# Patient Record
Sex: FEMALE | Race: WHITE | NOT HISPANIC OR LATINO | URBAN - METROPOLITAN AREA
[De-identification: names, ages, dates, MRNs, and addresses within clinical notes are randomized per-mention and may not be internally consistent; named-entity substitution may affect disease eponyms.]

---

## 2023-11-06 PROBLEM — N95.1 PERIMENOPAUSE: Status: ACTIVE | Noted: 2023-11-06

## 2023-11-06 PROBLEM — N94.6 DYSMENORRHEA: Status: ACTIVE | Noted: 2023-11-06

## 2024-01-08 ENCOUNTER — TELEPHONE (OUTPATIENT)
Dept: OBSTETRICS AND GYNECOLOGY | Facility: CLINIC | Age: 54
End: 2024-01-08
Payer: COMMERCIAL

## 2024-01-08 NOTE — TELEPHONE ENCOUNTER
Est patient last seen 3/16/2023. Has appt 3/27/2024. Has had Mirena IUD since 9/20/2017. Patient questioning if cramping and symptoms can be normal this far out with Mirena. States pain on right side monthly this month lasting for 8 days. No vaginal bleeding. Admits to bowel issues at times and mother passing away a month ago. Unsure if related to menopause. Last pelvic ultrasound was 5/8/2023 and essentially negative exam. Advised patient can be normal. If pain is severe and not relieved with OTC medication more concerning. Patient would like to monitor for now and will call office if symptoms increase or become more concerning. Advised will send a message to Dr. Torres as well to inform her and if she disagrees with monitoring will call.

## 2024-02-01 ENCOUNTER — APPOINTMENT (OUTPATIENT)
Dept: PRIMARY CARE | Facility: CLINIC | Age: 54
End: 2024-02-01
Payer: COMMERCIAL

## 2024-02-01 PROBLEM — N92.0 EXCESSIVE AND FREQUENT MENSTRUATION: Status: ACTIVE | Noted: 2024-02-01

## 2024-02-01 RX ORDER — ALPRAZOLAM 0.5 MG/1
TABLET ORAL
COMMUNITY
Start: 2024-01-31

## 2024-02-01 RX ORDER — BUPROPION HYDROCHLORIDE 150 MG/1
TABLET ORAL
COMMUNITY
Start: 2024-01-31

## 2024-02-01 RX ORDER — SIMVASTATIN 20 MG/1
TABLET, FILM COATED ORAL
COMMUNITY
Start: 2024-01-31

## 2024-03-05 ENCOUNTER — OFFICE VISIT (OUTPATIENT)
Dept: PRIMARY CARE | Facility: EXTERNAL LOCATION | Age: 54
End: 2024-03-05
Payer: COMMERCIAL

## 2024-03-05 VITALS
DIASTOLIC BLOOD PRESSURE: 98 MMHG | WEIGHT: 180 LBS | BODY MASS INDEX: 35.75 KG/M2 | OXYGEN SATURATION: 98 % | TEMPERATURE: 97.9 F | HEART RATE: 90 BPM | SYSTOLIC BLOOD PRESSURE: 144 MMHG

## 2024-03-05 DIAGNOSIS — J01.90 ACUTE NON-RECURRENT SINUSITIS, UNSPECIFIED LOCATION: Primary | ICD-10-CM

## 2024-03-05 DIAGNOSIS — J02.9 ACUTE PHARYNGITIS, UNSPECIFIED ETIOLOGY: ICD-10-CM

## 2024-03-05 DIAGNOSIS — R03.0 ELEVATED BLOOD PRESSURE READING: ICD-10-CM

## 2024-03-05 RX ORDER — FLUTICASONE PROPIONATE 50 MCG
2 SPRAY, SUSPENSION (ML) NASAL
COMMUNITY
Start: 2022-05-27

## 2024-03-05 RX ORDER — AMOXICILLIN 875 MG/1
875 TABLET, FILM COATED ORAL 2 TIMES DAILY
Qty: 20 TABLET | Refills: 0 | Status: SHIPPED | OUTPATIENT
Start: 2024-03-05 | End: 2024-03-15

## 2024-03-05 ASSESSMENT — ENCOUNTER SYMPTOMS
SORE THROAT: 1
COUGH: 0
FEVER: 0
PALPITATIONS: 0
SINUS PRESSURE: 1
CHILLS: 0
BACK PAIN: 0
SHORTNESS OF BREATH: 0

## 2024-03-05 NOTE — PROGRESS NOTES
Subjective   Patient ID: Brittany Alonso is a 53 y.o. female who presents for Sore Throat (Sore throat x 2 weeks).    HPI:  Sore throat for 2 weeks.   Started with congestion and cold symptoms. Symptoms went away and then came back again.     Continues with sore throat and mild sinus pressure. Sore throat on left side.   No fever or chills.   Not coughing.  Occasionally hoarse voice.      Occasional heartburn. Non currently. Had tacos the other night and had heartburn- resolved with antacid.     Allergies   Allergen Reactions    Clarithromycin GI Upset     Stomach cramps       Current Outpatient Medications on File Prior to Visit   Medication Sig    ALPRAZolam (Xanax) 0.5 mg tablet     buPROPion XL (Wellbutrin XL) 150 mg 24 hr tablet     fluticasone (Flonase) 50 mcg/actuation nasal spray 2 sprays by Does not apply route.    levonorgestrel (Mirena) 21 mcg/24 hours (8 yrs) 52 mg IUD as directed Intrauterine    sertraline (Zoloft) 100 mg tablet once every 24 hours.    simvastatin (Zocor) 20 mg tablet      No current facility-administered medications on file prior to visit.        Past Medical History:   Diagnosis Date    Anxiety     Hyperlipidemia     Perimenopause        Past Surgical History:   Procedure Laterality Date     SECTION, CLASSIC         Review of Systems   Constitutional:  Negative for chills and fever.   HENT:  Positive for sinus pressure (mild) and sore throat. Negative for congestion (resolved).    Respiratory:  Negative for cough and shortness of breath.    Cardiovascular:  Negative for chest pain and palpitations.   Musculoskeletal:  Negative for back pain.       Objective   Visit Vitals  BP (!) 144/98   Pulse 90   Temp 36.6 °C (97.9 °F)   Wt 81.6 kg (180 lb)   SpO2 98%   BMI 35.75 kg/m²   OB Status Perimenopausal   Smoking Status Never   BSA 1.85 m²         Physical Exam  Constitutional:       General: She is not in acute distress.     Appearance: Normal appearance.   HENT:      Right Ear:  Tympanic membrane, ear canal and external ear normal.      Left Ear: Tympanic membrane, ear canal and external ear normal.      Nose: Nose normal.      Mouth/Throat:      Mouth: Mucous membranes are moist.      Pharynx: Oropharynx is clear. Posterior oropharyngeal erythema (very mild) present. No oropharyngeal exudate.      Comments: Some PND seen  Eyes:      Extraocular Movements: Extraocular movements intact.      Conjunctiva/sclera: Conjunctivae normal.   Cardiovascular:      Rate and Rhythm: Normal rate and regular rhythm.   Pulmonary:      Effort: Pulmonary effort is normal.      Breath sounds: Normal breath sounds.   Musculoskeletal:      Cervical back: Neck supple.   Lymphadenopathy:      Cervical: Cervical adenopathy present.   Skin:     General: Skin is warm.   Neurological:      General: No focal deficit present.      Mental Status: She is alert.      Gait: Gait normal.   Psychiatric:         Mood and Affect: Mood normal.         Behavior: Behavior normal.         Thought Content: Thought content normal.         Assessment/Plan   Diagnoses and all orders for this visit:  Acute non-recurrent sinusitis, unspecified location  -     amoxicillin (Amoxil) 875 mg tablet; Take 1 tablet (875 mg) by mouth 2 times a day for 10 days.  Acute pharyngitis, unspecified etiology  Elevated blood pressure reading    -Discussed multiple possible etiologies including viral versus bacterial etiology and expected course of illness. Also discussed GERD.   - Will treat for sinusitis. Patient agreeable. Possible side effects of medication discussed.   - Recommend OTC Prilosec if having heartburn. Denies current heartburn.   - Can use OTC pain relief as needed.   - BP elevated. Instructed to monitor and recheck with PCP if remains elevated.   - Follow up with PCP for recheck in 3 days. Please be seen sooner with concerns or worsening symptoms.

## 2024-03-05 NOTE — PATIENT INSTRUCTIONS
SINUSITIS TREATMENT  - Your antibiotic was sent to the pharmacy  - Possible medication side effects and interactions were discussed.  - Recommend probiotic.   - Recommend Mucinex, Flonase and increased liquid intake.   - Follow up with your primary care provider  in 3 days if no improvement.   - Return to clinic sooner with concerns or  any worsening symptoms.     STEPS YOU CAN TAKE AT HOME  - Drink lots of liquids to stay hydrated.  - Use cool mist humidifier in sleeping areas to avoid dry air.   - Use saline nose drops or a saline nose rinse to relieve stuffiness.  - Wash your hands often. This will help keep others healthy.  - Do not smoke or be in smoke-filled places. Avoid things that may cause breathing problems like fumes, pollution, dust, and other common allergens and irritants.  - Avoid water diving. This forces water into the sinuses from the nasal passages.  - You may take over the counter medication to help with pain as needed.      CALL YOUR PROVIDER OR GO TO URGENT CARE/ED IF :  You have a stiff neck, especially if you also have fever, chills, vomiting, or severe headache  You have trouble thinking clearly.  You have trouble seeing or have double vision.  You have swelling or redness or pain around one or both eyes.  You have a fever of 102°F (38.9°C) or higher, or have shaking chills or sweats.  You have an upset stomach and throwing up.  You have more pain in your face and head.   You develop chest pain or shortness of breath.   With any worsening symptoms/concerns.

## 2024-03-21 ENCOUNTER — TELEPHONE (OUTPATIENT)
Dept: OBSTETRICS AND GYNECOLOGY | Facility: CLINIC | Age: 54
End: 2024-03-21
Payer: COMMERCIAL

## 2024-03-21 DIAGNOSIS — Z12.31 ENCOUNTER FOR SCREENING MAMMOGRAM FOR MALIGNANT NEOPLASM OF BREAST: ICD-10-CM

## 2024-03-22 DIAGNOSIS — Z12.31 ENCOUNTER FOR SCREENING MAMMOGRAM FOR MALIGNANT NEOPLASM OF BREAST: Primary | ICD-10-CM

## 2024-03-27 ENCOUNTER — OFFICE VISIT (OUTPATIENT)
Dept: OBSTETRICS AND GYNECOLOGY | Facility: CLINIC | Age: 54
End: 2024-03-27
Payer: COMMERCIAL

## 2024-03-27 ENCOUNTER — LAB (OUTPATIENT)
Dept: LAB | Facility: LAB | Age: 54
End: 2024-03-27
Payer: COMMERCIAL

## 2024-03-27 VITALS
HEIGHT: 59 IN | SYSTOLIC BLOOD PRESSURE: 138 MMHG | BODY MASS INDEX: 36.61 KG/M2 | DIASTOLIC BLOOD PRESSURE: 80 MMHG | WEIGHT: 181.6 LBS

## 2024-03-27 DIAGNOSIS — N95.1 PERIMENOPAUSE: ICD-10-CM

## 2024-03-27 DIAGNOSIS — N92.6 IRREGULAR MENSES: ICD-10-CM

## 2024-03-27 DIAGNOSIS — Z12.31 ENCOUNTER FOR SCREENING MAMMOGRAM FOR MALIGNANT NEOPLASM OF BREAST: ICD-10-CM

## 2024-03-27 DIAGNOSIS — Z30.432 ENCOUNTER FOR IUD REMOVAL: ICD-10-CM

## 2024-03-27 DIAGNOSIS — R10.2 PELVIC PAIN: ICD-10-CM

## 2024-03-27 DIAGNOSIS — Z01.419 VISIT FOR GYNECOLOGIC EXAMINATION: Primary | ICD-10-CM

## 2024-03-27 PROCEDURE — 99396 PREV VISIT EST AGE 40-64: CPT | Performed by: OBSTETRICS & GYNECOLOGY

## 2024-03-27 PROCEDURE — 58301 REMOVE INTRAUTERINE DEVICE: CPT | Performed by: OBSTETRICS & GYNECOLOGY

## 2024-03-27 PROCEDURE — 36415 COLL VENOUS BLD VENIPUNCTURE: CPT

## 2024-03-27 PROCEDURE — 83001 ASSAY OF GONADOTROPIN (FSH): CPT

## 2024-03-27 ASSESSMENT — ENCOUNTER SYMPTOMS
OCCASIONAL FEELINGS OF UNSTEADINESS: 0
DEPRESSION: 0
LOSS OF SENSATION IN FEET: 0

## 2024-03-27 ASSESSMENT — PATIENT HEALTH QUESTIONNAIRE - PHQ9
SUM OF ALL RESPONSES TO PHQ9 QUESTIONS 1 & 2: 0
2. FEELING DOWN, DEPRESSED OR HOPELESS: NOT AT ALL
1. LITTLE INTEREST OR PLEASURE IN DOING THINGS: NOT AT ALL

## 2024-03-27 ASSESSMENT — LIFESTYLE VARIABLES
HOW OFTEN DO YOU HAVE A DRINK CONTAINING ALCOHOL: 2-3 TIMES A WEEK
AUDIT-C TOTAL SCORE: 3
HOW OFTEN DO YOU HAVE SIX OR MORE DRINKS ON ONE OCCASION: NEVER
SKIP TO QUESTIONS 9-10: 1
HOW MANY STANDARD DRINKS CONTAINING ALCOHOL DO YOU HAVE ON A TYPICAL DAY: 1 OR 2

## 2024-03-27 ASSESSMENT — PAIN SCALES - GENERAL: PAINLEVEL: 0-NO PAIN

## 2024-03-27 NOTE — PROGRESS NOTES
"Annual-menopause  Subjective   Brittany Alonso is a 53 y.o. female who is here for a routine exam.   Complaints:  pos for irreg/protracted spotting and intermittent sharp pains either side of pelvis; nothing intense enough to req meds or stop her activities; questions menopause.   denies pelvic  pressure, or persistent bloating.  PMHx: PCP =NP @[facility].       BMI 37.  High cholesterol. Anxiety. Depression.  Surgical History        laparoscopy to [facility]; Dr. Berger; removed cysts        C section ;  tubal ligation ; D&C, x 2  Father:  58 yrs, cancer, lung  Mother: alive, diabetes, htn, s/p bso for cysts  Last pap  2023-BOTH pap/hpv NEG  Mamm 2023-NEG,    Birth control Mirena IUD;placed 2017.   Menarche 8. STDs none. currently sexually active; denies exposure concerns.   Total preg  4.   Pregnancy # 1:  spontaneous  W/ D&C.   Pregnancy # 2:  spontaneous  W/ D&C.   Pregnancy # 3:   FEMALE \"HUGHES\", Primary C/S.   Pregnancy # 4:   MALE \"STEPHANIE\", Repeat C/S.    Review of Systems   Constitutional:  Negative for activity change, fatigue and unexpected weight change.   Respiratory:  Negative for chest tightness and shortness of breath.    Cardiovascular:  Negative for chest pain and leg swelling.   Gastrointestinal:  Negative for abdominal distention and abdominal pain.   Genitourinary:  Positive for pelvic pain and vaginal bleeding. Negative for difficulty urinating, dysuria, genital sores, vaginal discharge and vaginal pain.   Musculoskeletal:  Negative for gait problem and joint swelling.   Skin:  Negative for color change and rash.   Neurological:  Negative for dizziness, weakness and headaches.   Hematological:  Negative for adenopathy.   Objective   /80   Ht 1.499 m (4' 11\")   Wt 82.4 kg (181 lb 9.6 oz)   BMI 36.68 kg/m²    General:   Alert and oriented, in no acute distress   Neck: Supple. No visible thyromegaly.    Breast/Axilla: Normal to " palpation bilaterally without masses, skin changes, or nipple discharge.    Abdomen: Soft, non-tender, without masses or organomegaly   Vulva: Normal architecture without erythema, masses, or lesions.    Vagina: Normal mucosa without lesions, masses.   No abnormal vaginal discharge. Upper vault with mall amt brown blood   Cervix: Normal without masses, lesions, or signs of cervicitis. NO strings visible; reports tenderness with palaption.   Uterus:  Grossly normal mobile, non-enlarged uterus; exam limited by bmi and scarring abd wall from c/s x 2    Adnexa: No palpable masses ortenderness   Pelvic Floor No POP noted. No high tone pelvic floor    Psych  Rectal Normal affect. Normal mood.      Assessment/Plan   Encounter Diagnoses   Name Primary?    Visit for gynecologic examination; grossly benign breast/gyn exam. Yes    Perimenopause; irreg menses on iud; wanting fsh chk     Encounter for screening mammogram for malignant neoplasm of breast     Irregular menses; either from iud or menopause     Pelvic pain; need to eval iud position     Encounter for IUD removal; strings NOT visible on speculum exam; strings AND iud found in lower uterine cavity/upper canal. Removed easily.       pt wanting iud located/removed  IUD Removal    Date/Time: 3/28/2024 7:27 AM    Performed by: Jacey Torres MD  Authorized by: Jacey Torres MD    Consent:     Consent obtained:  Verbal    Consent given by:  Patient    Procedure risks and benefits discussed: yes      Patient questions answered: yes      Patient agrees, verbalizes understanding, and wants to proceed: yes      Educational handouts given: no      Instructions and paperwork completed: no    Procedure:     Removed with no complications: yes (alligator forceps able to grasp shaft, already within canal of cervix)      Removal due to mechanical complications of IUD: no      Removal due to infection and inflammatory reaction: no      Other reason for removal:  Irreg  spotting/pain  Comments:      Removed easily; no resistance; device intact; only spotting after removal     Jacey Torres MD

## 2024-03-28 PROBLEM — R10.2 PELVIC PAIN: Status: ACTIVE | Noted: 2024-03-28

## 2024-03-28 PROBLEM — Z30.432 ENCOUNTER FOR IUD REMOVAL: Status: ACTIVE | Noted: 2024-03-28

## 2024-03-28 PROBLEM — N92.6 IRREGULAR MENSES: Status: ACTIVE | Noted: 2024-03-28

## 2024-03-28 PROBLEM — Z01.419 VISIT FOR GYNECOLOGIC EXAMINATION: Status: ACTIVE | Noted: 2024-03-28

## 2024-03-28 PROBLEM — Z12.31 ENCOUNTER FOR SCREENING MAMMOGRAM FOR MALIGNANT NEOPLASM OF BREAST: Status: ACTIVE | Noted: 2024-03-28

## 2024-03-28 LAB — FSH SERPL-ACNC: 12.7 IU/L

## 2024-03-28 PROCEDURE — 58301 REMOVE INTRAUTERINE DEVICE: CPT | Performed by: OBSTETRICS & GYNECOLOGY

## 2024-03-28 ASSESSMENT — ENCOUNTER SYMPTOMS
COLOR CHANGE: 0
JOINT SWELLING: 0
UNEXPECTED WEIGHT CHANGE: 0
CHEST TIGHTNESS: 0
FATIGUE: 0
SHORTNESS OF BREATH: 0
ADENOPATHY: 0
DIZZINESS: 0
HEADACHES: 0
ABDOMINAL PAIN: 0
WEAKNESS: 0
DIFFICULTY URINATING: 0
DYSURIA: 0
ACTIVITY CHANGE: 0
ABDOMINAL DISTENTION: 0

## 2024-04-03 ENCOUNTER — HOSPITAL ENCOUNTER (OUTPATIENT)
Dept: RADIOLOGY | Facility: CLINIC | Age: 54
Discharge: HOME | End: 2024-04-03
Payer: COMMERCIAL

## 2024-04-03 VITALS — BODY MASS INDEX: 36.29 KG/M2 | WEIGHT: 180 LBS | HEIGHT: 59 IN

## 2024-04-03 DIAGNOSIS — Z12.31 ENCOUNTER FOR SCREENING MAMMOGRAM FOR MALIGNANT NEOPLASM OF BREAST: ICD-10-CM

## 2024-04-03 PROCEDURE — 77063 BREAST TOMOSYNTHESIS BI: CPT | Performed by: RADIOLOGY

## 2024-04-03 PROCEDURE — 77067 SCR MAMMO BI INCL CAD: CPT | Performed by: RADIOLOGY

## 2024-04-03 PROCEDURE — 77067 SCR MAMMO BI INCL CAD: CPT

## 2024-10-21 ENCOUNTER — TELEPHONE (OUTPATIENT)
Dept: OBSTETRICS AND GYNECOLOGY | Facility: CLINIC | Age: 54
End: 2024-10-21
Payer: COMMERCIAL

## 2024-10-21 NOTE — TELEPHONE ENCOUNTER
Est pt last seen 03/27/2024 iud removal / Pt states that ever since IUD removed menses have been every 28 days / pt states this last menses lased 7 days stopped for 2 days then have a little spotting then stopped / pt state has a lot of cramping with this menses and some cramping has continued / pt wondering if this is sx normal menopause sx / advised msg to Dr Torres

## 2025-01-23 ENCOUNTER — TELEPHONE VISIT (OUTPATIENT)
Dept: PRIMARY CARE | Facility: EXTERNAL LOCATION | Age: 55
End: 2025-01-23
Payer: COMMERCIAL

## 2025-01-23 DIAGNOSIS — J01.90 ACUTE NON-RECURRENT SINUSITIS, UNSPECIFIED LOCATION: Primary | ICD-10-CM

## 2025-01-23 RX ORDER — AMOXICILLIN AND CLAVULANATE POTASSIUM 875; 125 MG/1; MG/1
875 TABLET, FILM COATED ORAL 2 TIMES DAILY
Qty: 14 TABLET | Refills: 0 | Status: SHIPPED | OUTPATIENT
Start: 2025-01-23 | End: 2025-01-30

## 2025-01-23 ASSESSMENT — ENCOUNTER SYMPTOMS
FATIGUE: 0
SHORTNESS OF BREATH: 0
BACK PAIN: 0
SORE THROAT: 0
CHILLS: 0
NUMBNESS: 0
DIZZINESS: 0
ABDOMINAL PAIN: 0
SINUS PRESSURE: 1
FEVER: 0
WEAKNESS: 0
CHEST TIGHTNESS: 0
PALPITATIONS: 0
SINUS PAIN: 1
VOMITING: 0
WHEEZING: 0
COUGH: 0
NAUSEA: 0
DIAPHORESIS: 0
DIARRHEA: 0

## 2025-01-23 NOTE — PATIENT INSTRUCTIONS
SINUSITIS TREATMENT  - Your antibiotic was sent to the pharmacy  - Possible medication side effects and interactions were discussed.  - Recommend probiotic.   - Recommend Mucinex, Flonase and increased liquid intake.   - Follow up with your primary care provider  in 2-3 days if no improvement.   - Return to clinic sooner with concerns or  any worsening symptoms.     STEPS YOU CAN TAKE AT HOME  - Drink lots of liquids to stay hydrated.  - Use cool mist humidifier in sleeping areas to avoid dry air.   - Use saline nose drops or a saline nose rinse to relieve stuffiness.  - Wash your hands often. This will help keep others healthy.  - Do not smoke or be in smoke-filled places. Avoid things that may cause breathing problems like fumes, pollution, dust, and other common allergens and irritants.  - Avoid water diving. This forces water into the sinuses from the nasal passages.  - You may take over the counter medication to help with pain as needed.      CALL YOUR PROVIDER OR GO TO URGENT CARE IF :  You have a stiff neck, especially if you also have fever, chills, vomiting, or severe headache  You have trouble thinking clearly.  You have trouble seeing or have double vision.  You have swelling or redness or pain around one or both eyes.  You have a fever of 102°F (38.9°C) or higher, or have shaking chills or sweats.  You have an upset stomach and throwing up.  You have more pain in your face and head.   You develop chest pain or shortness of breath.   With any worsening symptoms/concerns.     - Did discuss patient complaints of intermittent jaw pain. In lieu of other sinus symptoms as well as history of TMJ,  I suspect pain is related to one of these. However, discussed at length that cannot completely rule out cardiac cause and suggest patient be seen in person if has any other symptoms of chest pain, chest tightness, heartburn/indigestion, fatigue, SOB, palpitations,  arm pain, nausea, vomiting, abdominal pain, back  "pain, lightheaded, dizziness, weakness, sweating, not feeling \"right\". etc. Patient agreeable but denies any other symptoms other then sinus pain and pressure.       "

## 2025-01-23 NOTE — PROGRESS NOTES
"Subjective   Patient ID: Brittany Alonso is a 54 y.o. female who presents for Sinusitis (Thinks has sinus infection).      I performed this visit using realtime telehealth tools, including an audio/video OR telephone connection between the patient listed who was located in the [facility] and myself, Lauren Martinez.   The patient was made aware of the limitations of the telehealth visit.  They will not be physically examined and all issues may not be appropriate for a telehealth visit.  If necessary, an in person referral will be made.       DISCLAIMER:   In preparing for this visit and writing this note, I reviewed previous electronic medical records as available. Significant findings which helped in decision making are recorded in this encounter charting.    All patients allergies and medications were reviewed.        HPI:  Complains of mild congestion and post nasal drip.   States \"feels like sinus and nose is swollen\".  Especially on left hand side.   Complains of pressure and pain in sinus (worse on left side) for 1 week. Did have  a \"cold\" 1.5 weeks ago.   Not coughing much,   Prone to sinus infections and feels the same. Has seen ENT in the past. Last sinus infection 3/2024.  Complains of intermittent pain to back of jaw/teeth. Does have history of TMJ.     Allergies   Allergen Reactions    Clarithromycin GI Upset     Stomach cramps       Current Outpatient Medications   Medication Sig Dispense Refill    ALPRAZolam (Xanax) 0.5 mg tablet       fluticasone (Flonase) 50 mcg/actuation nasal spray 2 sprays by Does not apply route.      levonorgestrel (Mirena) 21 mcg/24 hours (8 yrs) 52 mg IUD as directed Intrauterine (Patient not taking: Reported on 1/23/2025)      sertraline (Zoloft) 100 mg tablet once every 24 hours.      simvastatin (Zocor) 20 mg tablet        No current facility-administered medications for this visit.        Past Medical History:   Diagnosis Date    Anxiety     Hyperlipidemia     " Perimenopause        Past Surgical History:   Procedure Laterality Date     SECTION, CLASSIC      ,        Review of Systems   Constitutional:  Negative for chills, diaphoresis, fatigue and fever.   HENT:  Positive for congestion (mild), postnasal drip, sinus pressure and sinus pain. Negative for sore throat.         Claims intermittent jaw/teeth pain   Respiratory:  Negative for cough, chest tightness, shortness of breath and wheezing.    Cardiovascular:  Negative for chest pain, palpitations and leg swelling.   Gastrointestinal:  Negative for abdominal pain, diarrhea, nausea and vomiting.        Denies heartburn or indigestion   Musculoskeletal:  Negative for back pain.        Denies arm pain   Neurological:  Negative for dizziness, syncope, weakness and numbness.       Objective   Visit Vitals  OB Status Perimenopausal   Smoking Status Never         Physical Exam  Neurological:      Mental Status: She is alert.   Psychiatric:         Mood and Affect: Mood normal.         Behavior: Behavior normal.         Thought Content: Thought content normal.       Telephone visit. No physical exam was performed. Patient talking in complete sentences without difficulty.  Mood normal. Thought content normal and thought process linear. Sounds well/laughing.       Assessment/Plan   Diagnoses and all orders for this visit:  Acute non-recurrent sinusitis, unspecified location  -     amoxicillin-pot clavulanate (Augmentin) 875-125 mg tablet; Take 1 tablet (875 mg) by mouth 2 times a day for 7 days.    This visit was completed via telephone/virtual visit. All issues as documented below were discussed and addressed but no physical exam was performed. If it was felt that the patient should be evaluated via  face-to-face office appointment(s) they were directed there. The patient verbally consented to this visit.       SINUSITIS  - Patient with over 1 week history of sinus pressure and pain. Reports is prone to sinus  "infections.  Will treat for acute bacterial sinusitis. Prescription sent to pharmacy and possible side effects discussed.   - Recommend probiotic while taking antibiotic.   - Recommend cool mist humidifier, increased liquid intake and can use Mucinex  and Tylenol prn.   - Follow up in person in 2-3 days if no improvement. Sooner with concerns or worsening symptoms.   - Recommend following up with ENT if has chronic sinus issues or frequent sinus infections.     - Did discuss patient complaints of intermittent jaw pain. In lieu of other sinus symptoms as well as history of TMJ,  I suspect pain is related to one of these. However, discussed at length that cannot completely rule out cardiac cause and suggest patient be seen in person if has any other symptoms of chest pain, chest tightness, palpitations, heartburn/indigestion, fatigue, SOB,  arm pain, nausea, vomiting, abdominal pain, back pain, lightheaded, dizziness, weakness, sweating, not feeling \"right\", etc. Patient agreeable but denies any other symptoms other then sinus pain and pressure.   "

## 2025-01-31 PROBLEM — E78.2 MIXED HYPERLIPIDEMIA: Status: ACTIVE | Noted: 2025-01-31

## 2025-01-31 ASSESSMENT — ENCOUNTER SYMPTOMS
HEADACHES: 0
DIZZINESS: 0
ABDOMINAL PAIN: 0
BLOOD IN STOOL: 0
LIGHT-HEADEDNESS: 0
SHORTNESS OF BREATH: 0

## 2025-01-31 NOTE — PROGRESS NOTES
Subjective   Patient ID: Brittany Alonso is a 54 y.o. female who presents for Annual Exam (Pt is here for physical, pt wants blood work  / nr) and GERD (Pt would like medication for acid reflex / nr).    HPI   NP here to establish care.    HLD - controlled on 20mg simvastatin. Sister w/CVA, mom w/HTN + HLD. Has increased consumption of salmon and cut back on red meat. Does not exercise routinely.     ABRAHAM - admits to high anxiety. She is on 100mg of zoloft. Has done counseling in the past. Rare panic attacks. Anxiety mostly triggered by health concerns. Plans on starting yoga.     Depression - PHQ9 of 5 today. States depression not concerning to her. Denies thoughts of self-harm.    GERD - started about 6-7 years ago. She has identified some of her triggers and has cut out caffeine which helps. Still takes pepcid sometimes daily as needed. She did take a 2 week course of PPI which helped. Had an acute episode of globus sensation last year. Saw ENT who did a laryngoscopy and was told everything was nml.    HM: pap 3/16/23, mammogram 4/3/24 (managed by Dr. Torres), had a colonoscopy at age 51 and needs repeat in 3 years.    Review of Systems   HENT:  Negative for hearing loss.    Eyes:  Negative for visual disturbance.   Respiratory:  Negative for shortness of breath.    Cardiovascular:  Negative for chest pain.   Gastrointestinal:  Negative for abdominal pain and blood in stool.   Neurological:  Negative for dizziness, light-headedness and headaches.     Patient Health Questionnaire-9 Score: 8   Patient Health Questionnaire-2 Score: 2 (2/3/2025  3:46 PM).  This indicates a positive screen but may not meet the criteria for a clinical depression diagnosis. Symptoms were reviewed with Brittany.  Follow-up within the next 3 months is recommended to re-assess symptoms and monitor mental health status.    Objective   /84   Pulse 106   Wt 89.3 kg (196 lb 12.8 oz)   SpO2 98%   BMI 39.75 kg/m²     Physical Exam  Vitals  reviewed.   Constitutional:       Appearance: Normal appearance.   HENT:      Head: Normocephalic and atraumatic.      Right Ear: Tympanic membrane and ear canal normal.      Left Ear: Tympanic membrane and ear canal normal.      Nose: Nose normal.      Mouth/Throat:      Mouth: Mucous membranes are moist.      Pharynx: No oropharyngeal exudate.   Eyes:      Extraocular Movements: Extraocular movements intact.      Conjunctiva/sclera: Conjunctivae normal.      Pupils: Pupils are equal, round, and reactive to light.   Cardiovascular:      Rate and Rhythm: Normal rate and regular rhythm.      Heart sounds: Normal heart sounds.   Pulmonary:      Effort: Pulmonary effort is normal.      Breath sounds: Normal breath sounds. No wheezing.   Abdominal:      General: There is no distension.      Palpations: Abdomen is soft.      Tenderness: There is no abdominal tenderness.   Musculoskeletal:         General: No tenderness.      Cervical back: Normal range of motion and neck supple.   Skin:     General: Skin is warm and dry.      Findings: No rash.   Neurological:      General: No focal deficit present.      Mental Status: She is alert. Mental status is at baseline.   Psychiatric:         Mood and Affect: Mood normal.         Assessment/Plan   Problem List Items Addressed This Visit             ICD-10-CM    ABRAHAM (generalized anxiety disorder) F41.1    Relevant Medications    sertraline (Zoloft) 100 mg tablet    Mixed hyperlipidemia E78.2    Relevant Medications    simvastatin (Zocor) 20 mg tablet    Other Relevant Orders    Lipid Panel    Tubular adenoma of colon D12.6    Relevant Orders    Colonoscopy Screening; Average Risk Patient     Other Visit Diagnoses         Codes    Routine medical exam    -  Primary Z00.00    Screening for diabetes mellitus     Z13.1    Relevant Orders    Comprehensive Metabolic Panel    Vitamin D deficiency     E55.9    Relevant Orders    Vitamin D 25-Hydroxy,Total (for eval of Vitamin D levels)     Encounter for screening for malignant neoplasm of colon     Z12.11    Relevant Orders    Colonoscopy Screening; Average Risk Patient    Screening, ischemic heart disease     Z13.6    Relevant Orders    CT cardiac scoring wo IV contrast    GERD without esophagitis     K21.9    Relevant Orders    Esophagogastroduodenoscopy (EGD)    Elevated blood pressure reading     R03.0          Recommend acquiring home BP cuff and check BP daily x 7 days and call/message w/readings.

## 2025-02-03 ENCOUNTER — OFFICE VISIT (OUTPATIENT)
Dept: PRIMARY CARE | Facility: CLINIC | Age: 55
End: 2025-02-03
Payer: COMMERCIAL

## 2025-02-03 VITALS
DIASTOLIC BLOOD PRESSURE: 84 MMHG | SYSTOLIC BLOOD PRESSURE: 140 MMHG | HEART RATE: 106 BPM | WEIGHT: 196.8 LBS | OXYGEN SATURATION: 98 % | BODY MASS INDEX: 39.75 KG/M2

## 2025-02-03 DIAGNOSIS — K21.9 GERD WITHOUT ESOPHAGITIS: ICD-10-CM

## 2025-02-03 DIAGNOSIS — Z00.00 ROUTINE MEDICAL EXAM: Primary | ICD-10-CM

## 2025-02-03 DIAGNOSIS — R03.0 ELEVATED BLOOD PRESSURE READING: ICD-10-CM

## 2025-02-03 DIAGNOSIS — Z13.6 SCREENING, ISCHEMIC HEART DISEASE: ICD-10-CM

## 2025-02-03 DIAGNOSIS — F41.1 GAD (GENERALIZED ANXIETY DISORDER): ICD-10-CM

## 2025-02-03 DIAGNOSIS — E55.9 VITAMIN D DEFICIENCY: ICD-10-CM

## 2025-02-03 DIAGNOSIS — D12.6 TUBULAR ADENOMA OF COLON: ICD-10-CM

## 2025-02-03 DIAGNOSIS — Z12.11 ENCOUNTER FOR SCREENING FOR MALIGNANT NEOPLASM OF COLON: ICD-10-CM

## 2025-02-03 DIAGNOSIS — Z13.1 SCREENING FOR DIABETES MELLITUS: ICD-10-CM

## 2025-02-03 DIAGNOSIS — E78.2 MIXED HYPERLIPIDEMIA: ICD-10-CM

## 2025-02-03 PROBLEM — N92.6 IRREGULAR MENSES: Status: RESOLVED | Noted: 2024-03-28 | Resolved: 2025-02-03

## 2025-02-03 PROBLEM — Z30.432 ENCOUNTER FOR IUD REMOVAL: Status: RESOLVED | Noted: 2024-03-28 | Resolved: 2025-02-03

## 2025-02-03 PROBLEM — E66.9 OBESITY WITH BODY MASS INDEX 30 OR GREATER: Status: ACTIVE | Noted: 2025-02-03

## 2025-02-03 PROCEDURE — 1036F TOBACCO NON-USER: CPT | Performed by: PHYSICIAN ASSISTANT

## 2025-02-03 PROCEDURE — 99386 PREV VISIT NEW AGE 40-64: CPT | Performed by: PHYSICIAN ASSISTANT

## 2025-02-03 RX ORDER — SIMVASTATIN 20 MG/1
20 TABLET, FILM COATED ORAL NIGHTLY
Qty: 90 TABLET | Refills: 3 | Status: SHIPPED | OUTPATIENT
Start: 2025-02-03

## 2025-02-03 RX ORDER — SERTRALINE HYDROCHLORIDE 100 MG/1
100 TABLET, FILM COATED ORAL EVERY 24 HOURS
Qty: 90 TABLET | Refills: 3 | Status: SHIPPED | OUTPATIENT
Start: 2025-02-03

## 2025-02-03 ASSESSMENT — PATIENT HEALTH QUESTIONNAIRE - PHQ9
6. FEELING BAD ABOUT YOURSELF - OR THAT YOU ARE A FAILURE OR HAVE LET YOURSELF OR YOUR FAMILY DOWN: SEVERAL DAYS
SUM OF ALL RESPONSES TO PHQ9 QUESTIONS 1 AND 2: 0
SUM OF ALL RESPONSES TO PHQ QUESTIONS 1-9: 8
10. IF YOU CHECKED OFF ANY PROBLEMS, HOW DIFFICULT HAVE THESE PROBLEMS MADE IT FOR YOU TO DO YOUR WORK, TAKE CARE OF THINGS AT HOME, OR GET ALONG WITH OTHER PEOPLE: VERY DIFFICULT
5. POOR APPETITE OR OVEREATING: NOT AT ALL
4. FEELING TIRED OR HAVING LITTLE ENERGY: SEVERAL DAYS
2. FEELING DOWN, DEPRESSED OR HOPELESS: NOT AT ALL
2. FEELING DOWN, DEPRESSED OR HOPELESS: SEVERAL DAYS
SUM OF ALL RESPONSES TO PHQ9 QUESTIONS 1 AND 2: 2
7. TROUBLE CONCENTRATING ON THINGS, SUCH AS READING THE NEWSPAPER OR WATCHING TELEVISION: SEVERAL DAYS
1. LITTLE INTEREST OR PLEASURE IN DOING THINGS: NOT AT ALL
9. THOUGHTS THAT YOU WOULD BE BETTER OFF DEAD, OR OF HURTING YOURSELF: NOT AT ALL
8. MOVING OR SPEAKING SO SLOWLY THAT OTHER PEOPLE COULD HAVE NOTICED. OR THE OPPOSITE, BEING SO FIGETY OR RESTLESS THAT YOU HAVE BEEN MOVING AROUND A LOT MORE THAN USUAL: SEVERAL DAYS
1. LITTLE INTEREST OR PLEASURE IN DOING THINGS: SEVERAL DAYS
3. TROUBLE FALLING OR STAYING ASLEEP OR SLEEPING TOO MUCH: MORE THAN HALF THE DAYS

## 2025-02-03 ASSESSMENT — COLUMBIA-SUICIDE SEVERITY RATING SCALE - C-SSRS
2. HAVE YOU ACTUALLY HAD ANY THOUGHTS OF KILLING YOURSELF?: NO
6. HAVE YOU EVER DONE ANYTHING, STARTED TO DO ANYTHING, OR PREPARED TO DO ANYTHING TO END YOUR LIFE?: NO
1. IN THE PAST MONTH, HAVE YOU WISHED YOU WERE DEAD OR WISHED YOU COULD GO TO SLEEP AND NOT WAKE UP?: NO

## 2025-02-03 ASSESSMENT — PAIN SCALES - GENERAL: PAINLEVEL_OUTOF10: 0-NO PAIN

## 2025-02-05 ENCOUNTER — PATIENT MESSAGE (OUTPATIENT)
Dept: PRIMARY CARE | Facility: CLINIC | Age: 55
End: 2025-02-05
Payer: COMMERCIAL

## 2025-02-05 LAB
25(OH)D3+25(OH)D2 SERPL-MCNC: 25 NG/ML (ref 30–100)
ALBUMIN SERPL-MCNC: 4.5 G/DL (ref 3.6–5.1)
ALP SERPL-CCNC: 76 U/L (ref 37–153)
ALT SERPL-CCNC: 18 U/L (ref 6–29)
ANION GAP SERPL CALCULATED.4IONS-SCNC: 9 MMOL/L (CALC) (ref 7–17)
AST SERPL-CCNC: 15 U/L (ref 10–35)
BILIRUB SERPL-MCNC: 0.8 MG/DL (ref 0.2–1.2)
BUN SERPL-MCNC: 15 MG/DL (ref 7–25)
CALCIUM SERPL-MCNC: 9.8 MG/DL (ref 8.6–10.4)
CHLORIDE SERPL-SCNC: 108 MMOL/L (ref 98–110)
CHOLEST SERPL-MCNC: 185 MG/DL
CHOLEST/HDLC SERPL: 3.1 (CALC)
CO2 SERPL-SCNC: 23 MMOL/L (ref 20–32)
CREAT SERPL-MCNC: 0.82 MG/DL (ref 0.5–1.03)
EGFRCR SERPLBLD CKD-EPI 2021: 85 ML/MIN/1.73M2
GLUCOSE SERPL-MCNC: 95 MG/DL (ref 65–99)
HDLC SERPL-MCNC: 60 MG/DL
LDLC SERPL CALC-MCNC: 101 MG/DL (CALC)
NONHDLC SERPL-MCNC: 125 MG/DL (CALC)
POTASSIUM SERPL-SCNC: 4.3 MMOL/L (ref 3.5–5.3)
PROT SERPL-MCNC: 6.8 G/DL (ref 6.1–8.1)
SODIUM SERPL-SCNC: 140 MMOL/L (ref 135–146)
TRIGL SERPL-MCNC: 147 MG/DL

## 2025-02-20 ENCOUNTER — PATIENT MESSAGE (OUTPATIENT)
Dept: PRIMARY CARE | Facility: CLINIC | Age: 55
End: 2025-02-20
Payer: COMMERCIAL

## 2025-03-28 ASSESSMENT — ENCOUNTER SYMPTOMS
WEAKNESS: 0
SHORTNESS OF BREATH: 0
COLOR CHANGE: 0
FATIGUE: 0
DIZZINESS: 0
ADENOPATHY: 0
HEADACHES: 0
DIFFICULTY URINATING: 0
ABDOMINAL PAIN: 0
ABDOMINAL DISTENTION: 0
UNEXPECTED WEIGHT CHANGE: 0
DYSURIA: 0
ACTIVITY CHANGE: 0
JOINT SWELLING: 0
CHEST TIGHTNESS: 0

## 2025-03-28 NOTE — PROGRESS NOTES
Annual  Subjective   Brittany Alonso is a 54 y.o. female , last seen 3/27/2024 for iud removal, who is here for a routine exam.   Complaints:     Last Pap 3/16/2023 neg/HPV neg  Last mitzi/3/24 negative  Colonoscopy 03/9/2022 positive polyps; Dr. Rafi Wylie; repeat scope advised 3 years  Review of Systems   Constitutional:  Negative for activity change, fatigue and unexpected weight change.   Respiratory:  Negative for chest tightness and shortness of breath.    Cardiovascular:  Negative for chest pain and leg swelling.   Gastrointestinal:  Negative for abdominal distention and abdominal pain.   Genitourinary:  Negative for difficulty urinating, dysuria, genital sores, pelvic pain, vaginal bleeding, vaginal discharge and vaginal pain.   Musculoskeletal:  Negative for gait problem and joint swelling.   Skin:  Negative for color change and rash.   Neurological:  Negative for dizziness, weakness and headaches.   Hematological:  Negative for adenopathy.   Objective   There were no vitals taken for this visit.   General:   Alert and oriented, in no acute distress   Neck: Supple. No visible thyromegaly.    Breast/Axilla: Normal to palpation bilaterally without masses, skin changes, or nipple discharge.    Abdomen: Soft, non-tender, without masses or organomegaly   Vulva: Normal architecture without erythema, masses, or lesions.    Vagina: Normal mucosa without lesions, masses, or atrophy. No abnormal vaginal discharge.    Cervix: Normal without masses, lesions, or signs of cervicitis   Uterus: Normal, mobile, non-enlarged uterus   Adnexa: Normal without masses or lesions   Pelvic Floor normal   Psych Normal affect. Normal mood.    Assessment/Plan   Routine annual  Pap due  Mammogram    Jacey Torres MD

## 2025-04-01 ENCOUNTER — APPOINTMENT (OUTPATIENT)
Dept: OBSTETRICS AND GYNECOLOGY | Facility: CLINIC | Age: 55
End: 2025-04-01
Payer: COMMERCIAL

## 2025-04-01 DIAGNOSIS — Z01.419 VISIT FOR GYNECOLOGIC EXAMINATION: Primary | ICD-10-CM

## 2025-04-01 DIAGNOSIS — Z86.0109 PERSONAL HISTORY OF OTHER COLON POLYPS: ICD-10-CM

## 2025-04-01 DIAGNOSIS — Z12.31 ENCOUNTER FOR SCREENING MAMMOGRAM FOR MALIGNANT NEOPLASM OF BREAST: ICD-10-CM

## 2025-04-01 DIAGNOSIS — Z12.11 SCREEN FOR COLON CANCER: ICD-10-CM

## 2025-04-01 DIAGNOSIS — M81.0 POSTMENOPAUSAL BONE LOSS: ICD-10-CM

## 2025-04-01 DIAGNOSIS — N95.2 POSTMENOPAUSAL ATROPHIC VAGINITIS: ICD-10-CM

## 2025-04-01 DIAGNOSIS — N95.1 PERIMENOPAUSE: ICD-10-CM

## 2025-04-10 ENCOUNTER — APPOINTMENT (OUTPATIENT)
Dept: OBSTETRICS AND GYNECOLOGY | Facility: CLINIC | Age: 55
End: 2025-04-10
Payer: COMMERCIAL

## 2025-04-24 ENCOUNTER — HOSPITAL ENCOUNTER (OUTPATIENT)
Dept: RADIOLOGY | Facility: CLINIC | Age: 55
Discharge: HOME | End: 2025-04-24
Payer: COMMERCIAL

## 2025-04-24 DIAGNOSIS — Z13.6 SCREENING, ISCHEMIC HEART DISEASE: ICD-10-CM

## 2025-04-24 PROCEDURE — 75571 CT HRT W/O DYE W/CA TEST: CPT

## 2025-04-25 ENCOUNTER — PATIENT MESSAGE (OUTPATIENT)
Dept: PRIMARY CARE | Facility: CLINIC | Age: 55
End: 2025-04-25
Payer: COMMERCIAL

## 2025-04-25 DIAGNOSIS — F41.1 GAD (GENERALIZED ANXIETY DISORDER): Primary | ICD-10-CM

## 2025-04-25 RX ORDER — ALPRAZOLAM 0.5 MG/1
0.25 TABLET ORAL DAILY PRN
Qty: 30 TABLET | Refills: 0 | Status: SHIPPED | OUTPATIENT
Start: 2025-04-25

## 2025-06-01 ASSESSMENT — ENCOUNTER SYMPTOMS
DYSURIA: 0
UNEXPECTED WEIGHT CHANGE: 0
ACTIVITY CHANGE: 0
DIZZINESS: 0
JOINT SWELLING: 0
SHORTNESS OF BREATH: 0
DIFFICULTY URINATING: 0
WEAKNESS: 0
ADENOPATHY: 0
ABDOMINAL PAIN: 0
FATIGUE: 0
CHEST TIGHTNESS: 0
ABDOMINAL DISTENTION: 0
HEADACHES: 0
COLOR CHANGE: 0

## 2025-06-01 NOTE — PROGRESS NOTES
"Annual  Subjective   Brittany Alonso is a 54 y.o.  female , last seen 3/27/24, who is here for a routine exam.   Complaints:  labia itching; pos night sweats; dry eyes; hair loss--frontal and temporal;  TSH nl;  last FSH  3/27/2024=12.7  Menses still coming every month;  very short /sometimes very heavy/clotty/painful  BCM: Mirena placed 2017; removed 2024 b/c displaced lowered  Last pap 3/16/23 neg/hpv neg  Menarche 8; std none; sexually active  Mamm 4/3/24 neg  C scope age 51/pos polyps; rpt due  ; completes at F  BMI 39.  High chol. Anxiety/Depression.  Surg Hx: laparoscopy to [facility] fertility; Dr. Berger; dxd pcos; removed cysts        C section ;  tubal ligation ; D&C, x 2  Father:  58 yrs, cancer, lung;  Mother: alive, diabetes,hx dvt,  htn, s/p bso for cysts  Review of Systems   Constitutional:  Negative for activity change, fatigue and unexpected weight change.   Respiratory:  Negative for chest tightness and shortness of breath.    Cardiovascular:  Negative for chest pain and leg swelling.   Gastrointestinal:  Negative for abdominal distention and abdominal pain.   Genitourinary:  Negative for difficulty urinating, dysuria, genital sores, pelvic pain, vaginal bleeding, vaginal discharge and vaginal pain.   Musculoskeletal:  Negative for gait problem and joint swelling.   Skin:  Negative for color change and rash.        Vulvar/labial itching   Neurological:  Negative for dizziness, weakness and headaches.   Hematological:  Negative for adenopathy.   Objective Visit Vitals  /84   Ht 1.499 m (4' 11\")   Wt 87.3 kg (192 lb 6.4 oz)   LMP 2025 (Exact Date)   BMI 38.86 kg/m²   OB Status Perimenopausal   Smoking Status Never   BSA 1.91 m²       General:   Alert and oriented, in no acute distress   Neck: Supple. No visible thyromegaly.    Breast/Axilla: Normal to palpation bilaterally without masses, skin changes, or nipple discharge.    Abdomen: Soft, non-tender, " without masses or organomegaly   Vulva: Normal architecture without erythema, masses, or lesions.    Vagina: Normal mucosa without lesions, masses, or atrophy. No abnormal vaginal discharge.    Cervix: Normal without masses, lesions, or signs of cervicitis   Uterus: Grossly normal, mobile, non-enlarged uterus; exam limited by bmi   Adnexa: No palpable  masses or tenderness; exam limited by bmi   Pelvic Floor normal   Psych Normal affect. Normal mood.    Assessment/Plan   Encounter Diagnoses   Name Primary?    Visit for gynecologic examination; no suspicious findings on current breast or pelvic exam. Yes    Encounter for screening mammogram for malignant neoplasm of breast; order placed.     Perimenopause; still getting menses each month; last 2 months very short/lighter flow; last FSH 12.7; reviewed typical transition and treatment options to manage menstrual complaints     Dysmenorrhea; most bothersome symptoms since removing IUD/wanting intervention; will trial vaginal NuvaRing/wanting to avoid oral estrogen due to family history of thrombosis.     Irregular menses; wanting intervention     PCOS (polycystic ovarian syndrome); trial NuvaRing.     Family history of deep vein thrombosis; wanting to avoid oral estrogen     Screen for colon cancer; history of polyps; upcoming scope at [facility].     Hair loss; reviewed tx options= spironolactone or minoxidil; patient will consider; 2024 TSH normal.     Jacey Torres MD

## 2025-06-03 ENCOUNTER — OFFICE VISIT (OUTPATIENT)
Dept: OBSTETRICS AND GYNECOLOGY | Facility: CLINIC | Age: 55
End: 2025-06-03
Payer: COMMERCIAL

## 2025-06-03 VITALS
HEIGHT: 59 IN | DIASTOLIC BLOOD PRESSURE: 84 MMHG | SYSTOLIC BLOOD PRESSURE: 148 MMHG | BODY MASS INDEX: 38.79 KG/M2 | WEIGHT: 192.4 LBS

## 2025-06-03 DIAGNOSIS — L65.9 HAIR LOSS: ICD-10-CM

## 2025-06-03 DIAGNOSIS — N95.1 PERIMENOPAUSE: ICD-10-CM

## 2025-06-03 DIAGNOSIS — Z12.31 ENCOUNTER FOR SCREENING MAMMOGRAM FOR MALIGNANT NEOPLASM OF BREAST: ICD-10-CM

## 2025-06-03 DIAGNOSIS — Z12.11 SCREEN FOR COLON CANCER: ICD-10-CM

## 2025-06-03 DIAGNOSIS — E28.2 PCOS (POLYCYSTIC OVARIAN SYNDROME): ICD-10-CM

## 2025-06-03 DIAGNOSIS — N92.6 IRREGULAR MENSES: ICD-10-CM

## 2025-06-03 DIAGNOSIS — N94.6 DYSMENORRHEA: ICD-10-CM

## 2025-06-03 DIAGNOSIS — Z82.49 FAMILY HISTORY OF DEEP VEIN THROMBOSIS: ICD-10-CM

## 2025-06-03 DIAGNOSIS — Z01.419 VISIT FOR GYNECOLOGIC EXAMINATION: Primary | ICD-10-CM

## 2025-06-03 PROCEDURE — 99396 PREV VISIT EST AGE 40-64: CPT | Performed by: OBSTETRICS & GYNECOLOGY

## 2025-06-03 PROCEDURE — 1036F TOBACCO NON-USER: CPT | Performed by: OBSTETRICS & GYNECOLOGY

## 2025-06-03 PROCEDURE — 3008F BODY MASS INDEX DOCD: CPT | Performed by: OBSTETRICS & GYNECOLOGY

## 2025-06-03 RX ORDER — ETONOGESTREL AND ETHINYL ESTRADIOL VAGINAL RING .015; .12 MG/D; MG/D
1 RING VAGINAL
Qty: 1 EACH | Refills: 11 | Status: SHIPPED | OUTPATIENT
Start: 2025-06-03 | End: 2026-06-03

## 2025-06-03 ASSESSMENT — ENCOUNTER SYMPTOMS
DEPRESSION: 0
LOSS OF SENSATION IN FEET: 0
OCCASIONAL FEELINGS OF UNSTEADINESS: 0

## 2025-06-03 ASSESSMENT — LIFESTYLE VARIABLES
AUDIT-C TOTAL SCORE: 3
HOW MANY STANDARD DRINKS CONTAINING ALCOHOL DO YOU HAVE ON A TYPICAL DAY: 1 OR 2
HOW OFTEN DO YOU HAVE SIX OR MORE DRINKS ON ONE OCCASION: NEVER
SKIP TO QUESTIONS 9-10: 1
HOW OFTEN DO YOU HAVE A DRINK CONTAINING ALCOHOL: 2-3 TIMES A WEEK

## 2025-06-03 ASSESSMENT — PAIN SCALES - GENERAL: PAINLEVEL_OUTOF10: 0-NO PAIN

## 2025-06-04 ENCOUNTER — HOSPITAL ENCOUNTER (OUTPATIENT)
Dept: RADIOLOGY | Facility: CLINIC | Age: 55
Discharge: HOME | End: 2025-06-04
Payer: COMMERCIAL

## 2025-06-04 DIAGNOSIS — Z12.31 ENCOUNTER FOR SCREENING MAMMOGRAM FOR MALIGNANT NEOPLASM OF BREAST: ICD-10-CM

## 2025-06-04 PROCEDURE — 77067 SCR MAMMO BI INCL CAD: CPT | Performed by: STUDENT IN AN ORGANIZED HEALTH CARE EDUCATION/TRAINING PROGRAM

## 2025-06-04 PROCEDURE — 77067 SCR MAMMO BI INCL CAD: CPT

## 2025-06-04 PROCEDURE — 77063 BREAST TOMOSYNTHESIS BI: CPT | Performed by: STUDENT IN AN ORGANIZED HEALTH CARE EDUCATION/TRAINING PROGRAM
